# Patient Record
Sex: MALE | Race: BLACK OR AFRICAN AMERICAN | URBAN - METROPOLITAN AREA
[De-identification: names, ages, dates, MRNs, and addresses within clinical notes are randomized per-mention and may not be internally consistent; named-entity substitution may affect disease eponyms.]

---

## 2021-03-05 ENCOUNTER — HOSPITAL ENCOUNTER (EMERGENCY)
Facility: CLINIC | Age: 36
Discharge: HOME | End: 2021-03-05
Attending: PSYCHIATRY & NEUROLOGY | Admitting: PSYCHIATRY & NEUROLOGY
Payer: COMMERCIAL

## 2021-03-05 VITALS
TEMPERATURE: 96.6 F | SYSTOLIC BLOOD PRESSURE: 117 MMHG | RESPIRATION RATE: 18 BRPM | OXYGEN SATURATION: 96 % | HEART RATE: 98 BPM | DIASTOLIC BLOOD PRESSURE: 62 MMHG

## 2021-03-05 DIAGNOSIS — F14.24 COCAINE DEPENDENCE WITH COCAINE-INDUCED MOOD DISORDER (H): ICD-10-CM

## 2021-03-05 DIAGNOSIS — F15.180: ICD-10-CM

## 2021-03-05 DIAGNOSIS — F19.20 CHEMICAL DEPENDENCY (H): ICD-10-CM

## 2021-03-05 DIAGNOSIS — F39 MILD MOOD DISORDER (H): ICD-10-CM

## 2021-03-05 DIAGNOSIS — F43.23 ADJUSTMENT DISORDER WITH MIXED ANXIETY AND DEPRESSED MOOD: ICD-10-CM

## 2021-03-05 DIAGNOSIS — F15.129: ICD-10-CM

## 2021-03-05 DIAGNOSIS — F12.10 CANNABIS ABUSE, CONTINUOUS: ICD-10-CM

## 2021-03-05 LAB
AMPHETAMINES UR QL SCN: POSITIVE
BARBITURATES UR QL: NEGATIVE
BENZODIAZ UR QL: NEGATIVE
CANNABINOIDS UR QL SCN: POSITIVE
COCAINE UR QL: POSITIVE
ETHANOL UR QL SCN: NEGATIVE
OPIATES UR QL SCN: NEGATIVE

## 2021-03-05 PROCEDURE — 90791 PSYCH DIAGNOSTIC EVALUATION: CPT

## 2021-03-05 PROCEDURE — 99284 EMERGENCY DEPT VISIT MOD MDM: CPT | Performed by: PSYCHIATRY & NEUROLOGY

## 2021-03-05 PROCEDURE — 80320 DRUG SCREEN QUANTALCOHOLS: CPT | Performed by: EMERGENCY MEDICINE

## 2021-03-05 PROCEDURE — 80307 DRUG TEST PRSMV CHEM ANLYZR: CPT | Performed by: EMERGENCY MEDICINE

## 2021-03-05 PROCEDURE — 250N000013 HC RX MED GY IP 250 OP 250 PS 637: Performed by: PSYCHIATRY & NEUROLOGY

## 2021-03-05 PROCEDURE — 99285 EMERGENCY DEPT VISIT HI MDM: CPT | Mod: 25

## 2021-03-05 RX ORDER — IBUPROFEN 600 MG/1
600 TABLET, FILM COATED ORAL ONCE
Status: COMPLETED | OUTPATIENT
Start: 2021-03-05 | End: 2021-03-05

## 2021-03-05 RX ORDER — IBUPROFEN 600 MG/1
600 TABLET, FILM COATED ORAL ONCE
Status: DISCONTINUED | OUTPATIENT
Start: 2021-03-05 | End: 2021-03-06 | Stop reason: HOSPADM

## 2021-03-05 RX ADMIN — IBUPROFEN 600 MG: 600 TABLET ORAL at 22:11

## 2021-03-05 ASSESSMENT — ENCOUNTER SYMPTOMS
CARDIOVASCULAR NEGATIVE: 1
HALLUCINATIONS: 0
DECREASED CONCENTRATION: 1
HYPERACTIVE: 0
EYES NEGATIVE: 1
SLEEP DISTURBANCE: 1
RESPIRATORY NEGATIVE: 1
MUSCULOSKELETAL NEGATIVE: 1
CONSTITUTIONAL NEGATIVE: 1
NEUROLOGICAL NEGATIVE: 1
GASTROINTESTINAL NEGATIVE: 1

## 2021-03-05 NOTE — DISCHARGE INSTRUCTIONS
Please work on sobriety. Pursue a rule 25 for a CD assessment and recommendations for treatment. The rule 25 will authorize coverage for treatment.  Pursue getting insurance. Fill out the forms provided to start your process.  In the meantime, look into [facility] services for support and care. See handout.

## 2021-03-05 NOTE — ED PROVIDER NOTES
"    [facility] EMERGENCY DEPARTMENT ([facility])     March 5, 2021  History     Chief Complaint   Patient presents with     Addiction Problem     Pt seeking help for addiction problem. Also states he wants a mental health evaluation     HPI  Prince Mluler is a 35 year old male who presents to the ED today complaining of an addiction problem.  Per nursing notes, he has been using cocaine recently but has used pretty much everything, including methamphetamines and \"wet sticks.\"  Patient lived in [place] and came to [place] 4 months ago to see his son. He decided to stay and has been hanging out with acquaintances and couch hoping. He currently is living out of his car. He has no history of psychiatric diagnosis, nor been in CD treatment. He is unemployed and uninsured. He is not sure of how to go about getting services. He is not suicidal. He is not psychotic.    Please see DEC Crisis Assessment on 3/5/21 in Epic for further details.    I have reviewed the Medications, Allergies, Past Medical and Surgical History, and Social History in the [employer] system.    PAST MEDICAL HISTORY: History reviewed. No pertinent past medical history.    PAST SURGICAL HISTORY: History reviewed. No pertinent surgical history.    Past medical history, past surgical history, medications, and allergies were reviewed with the patient.     FAMILY HISTORY: No family history on file.    SOCIAL HISTORY:   Social History     Tobacco Use     Smoking status: Current Every Day Smoker     Smokeless tobacco: Never Used   Substance Use Topics     Alcohol use: Yes     Comment: daily     Social history was reviewed with the patient.       Patient's Medications    No medications on file        No Known Allergies     Review of Systems   Constitutional: Negative.    HENT: Negative.    Eyes: Negative.    Respiratory: Negative.    Cardiovascular: Negative.    Gastrointestinal: Negative.    Genitourinary: Negative.    Musculoskeletal: Negative.  "   Skin: Negative.    Neurological: Negative.    Psychiatric/Behavioral: Positive for decreased concentration and sleep disturbance. Negative for hallucinations and suicidal ideas. The patient is not hyperactive.    All other systems reviewed and are negative.        Physical Exam   BP: (!) 115/99  Pulse: 98  Temp: 99.2  F (37.3  C)  Resp: 16  SpO2: 99 %      Physical Exam  Vitals signs and nursing note reviewed.   HENT:      Head: Normocephalic.   Eyes:      Pupils: Pupils are equal, round, and reactive to light.   Neck:      Musculoskeletal: Normal range of motion.   Pulmonary:      Effort: Pulmonary effort is normal.   Musculoskeletal: Normal range of motion.   Neurological:      General: No focal deficit present.      Mental Status: He is alert.   Psychiatric:         Attention and Perception: Attention and perception normal. He does not perceive auditory or visual hallucinations.         Mood and Affect: Mood and affect normal.         Speech: Speech normal.         Behavior: Behavior normal. Behavior is not agitated, aggressive, hyperactive or combative. Behavior is cooperative.         Thought Content: Thought content normal. Thought content is not paranoid or delusional. Thought content does not include homicidal or suicidal ideation.         Cognition and Memory: Cognition and memory normal.         Judgment: Judgment normal.         ED Course        Procedures               Results for orders placed or performed during the hospital encounter of 03/05/21 (from the past 24 hour(s))   Drug abuse screen 6 urine (tox)   Result Value Ref Range    Amphetamine Qual Urine Positive (A) NEG^Negative    Barbiturates Qual Urine Negative NEG^Negative    Benzodiazepine Qual Urine Negative NEG^Negative    Cannabinoids Qual Urine Positive (A) NEG^Negative    Cocaine Qual Urine Positive (A) NEG^Negative    Ethanol Qual Urine Negative NEG^Negative    Opiates Qualitative Urine Negative NEG^Negative     Medications - No data to  display          Assessments & Plan (with Medical Decision Making)   Patient with polysubstance abuse who is here seeking help for his drug problem. He is looking for any available supportive services. Patient is not acutely unsafe nor is altered and does not need urgent intervention. He can be discharged. He is provided with resources for [place] [id number] and [facility] mental health services. Registration will provide patient with an insurance application in order to apply for insurance. Patient can be discharged.    I have reviewed the nursing notes.    I have reviewed the findings, diagnosis, plan and need for follow up with the patient.    New Prescriptions    No medications on file       Final diagnoses:   Chemical dependency (H)   Adjustment disorder with mixed anxiety and depressed mood       3/5/2021   [facility] EMERGENCY DEPARTMENT     Nicolás Boo MD  03/05/21 [id number]

## 2021-03-05 NOTE — ED TRIAGE NOTES
"PT states the he wants a mental health evaluation and get into a drug addiction program. Pt states he is homeless and is trying to get his act together. Pt denies SI. Has been using cocaine recently but has used \"pretty much everything\".  "